# Patient Record
Sex: FEMALE | Race: OTHER | HISPANIC OR LATINO | URBAN - METROPOLITAN AREA
[De-identification: names, ages, dates, MRNs, and addresses within clinical notes are randomized per-mention and may not be internally consistent; named-entity substitution may affect disease eponyms.]

---
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
## 2025-02-16 NOTE — ED PROVIDER NOTE - TOOTH FINDINGS
DDX: Likely viral syndrome given history, exam, constellation of symptoms. doubt bacterial infection at this time.     DX: COVID/flu/RSV    TX: Analgesia PRN. No indication for antibiotics.    Dispo: Discharge to follow up with PMD within 3-5 days with precautions for RTED and supportive care recommendations no visible abnormalities